# Patient Record
Sex: MALE | Race: WHITE
[De-identification: names, ages, dates, MRNs, and addresses within clinical notes are randomized per-mention and may not be internally consistent; named-entity substitution may affect disease eponyms.]

---

## 2017-12-30 ENCOUNTER — HOSPITAL ENCOUNTER (EMERGENCY)
Dept: HOSPITAL 31 - C.ER | Age: 57
Discharge: HOME | End: 2017-12-30
Payer: COMMERCIAL

## 2017-12-30 VITALS — HEART RATE: 90 BPM | DIASTOLIC BLOOD PRESSURE: 91 MMHG | SYSTOLIC BLOOD PRESSURE: 164 MMHG

## 2017-12-30 VITALS — RESPIRATION RATE: 20 BRPM | TEMPERATURE: 98.2 F

## 2017-12-30 VITALS — OXYGEN SATURATION: 100 %

## 2017-12-30 DIAGNOSIS — E11.9: ICD-10-CM

## 2017-12-30 DIAGNOSIS — H93.8X9: Primary | ICD-10-CM

## 2017-12-30 DIAGNOSIS — I10: ICD-10-CM

## 2017-12-30 NOTE — C.PDOC
History Of Present Illness


57 year old male presents to the ER stating he feels something moving in his 

ear. Denies loss of hearing or ear pain.


Chief Complaint (Nursing): ENT Problem


History Per: Patient


History/Exam Limitations: None


Onset/Duration Of Symptoms: Hrs


Current Symptoms Are (Timing): Gone


Quality (Ear): Foreign Body (Something moving)





Past Medical History


Reviewed: Historical Data, Nursing Documentation, Vital Signs


Vital Signs: 


 Last Vital Signs











Temp  98.2 F   12/30/17 04:46


 


Pulse  90   12/30/17 05:01


 


Resp  20   12/30/17 05:01


 


BP  164/91 H  12/30/17 05:01


 


Pulse Ox  99   12/30/17 05:01














- Medical History


PMH: Diabetes, HTN


Family History: States: Unknown Family Hx





- Social History


Hx Alcohol Use: No


Hx Substance Use: No





- Immunization History


Hx Tetanus Toxoid Vaccination: No


Hx Influenza Vaccination: No


Hx Pneumococcal Vaccination: No





Review Of Systems


ENT: Positive for: Other (Something moving in ear).  Negative for: Ear Pain, 

Ear Discharge





Physical Exam





- Physical Exam


Appears: Non-toxic, No Acute Distress


Skin: Normal Color, Warm, Dry


Head: Atraumatic, Normacephalic


Eye(s): bilateral: Normal Inspection


Ear(s): Bilateral: Normal (No foreign body in the canal)





ED Course And Treatment


O2 Sat by Pulse Oximetry: 100 (Room air)


Pulse Ox Interpretation: Normal


Progress Note: No foreign body found within patient's ear, patient also reports 

the sensation as resolved. Will discharge with instructions to follow up with 

PMD.





Disposition





- Disposition


Disposition: HOME/ ROUTINE


Disposition Time: 04:54


Condition: STABLE


Additional Instructions: 


Follow up with PMD as needed. Return to ED if feel worse.


Instructions:  Ear Foreign Body (ED)


Forms:  [facility] (English)





- Clinical Impression


Clinical Impression: 


 Ear discomfort








- PA / NP / Resident Statement


MD/DO has reviewed & agrees with the documentation as recorded.





- Scribe Statement


The provider has reviewed the documentation as recorded by the Scribroel Orourke





All medical record entries made by the Scribe were at my direction and 

personally dictated by me. I have reviewed the chart and agree that the record 

accurately reflects my personal performance of the history, physical exam, 

medical decision making, and the department course for this patient. I have 

also personally directed, reviewed, and agree with the discharge instructions 

and disposition.

## 2018-02-15 ENCOUNTER — HOSPITAL ENCOUNTER (EMERGENCY)
Dept: HOSPITAL 31 - C.ER | Age: 58
Discharge: HOME | End: 2018-02-15
Payer: COMMERCIAL

## 2018-02-15 VITALS — HEART RATE: 78 BPM | RESPIRATION RATE: 20 BRPM | SYSTOLIC BLOOD PRESSURE: 130 MMHG | DIASTOLIC BLOOD PRESSURE: 80 MMHG

## 2018-02-15 VITALS — TEMPERATURE: 98.5 F | OXYGEN SATURATION: 98 %

## 2018-02-15 VITALS — BODY MASS INDEX: 37.5 KG/M2

## 2018-02-15 DIAGNOSIS — E11.65: Primary | ICD-10-CM

## 2018-02-15 LAB
ALBUMIN SERPL-MCNC: 4.3 G/DL (ref 3.5–5)
ALBUMIN/GLOB SERPL: 1.2 {RATIO} (ref 1–2.1)
ALT SERPL-CCNC: 45 U/L (ref 21–72)
APTT BLD: 37 SECONDS (ref 21–34)
AST SERPL-CCNC: 35 U/L (ref 17–59)
BASOPHILS # BLD AUTO: 0 K/UL (ref 0–0.2)
BASOPHILS NFR BLD: 0.6 % (ref 0–2)
BUN SERPL-MCNC: 14 MG/DL (ref 9–20)
CALCIUM SERPL-MCNC: 9.2 MG/DL (ref 8.6–10.4)
EOSINOPHIL # BLD AUTO: 0.1 K/UL (ref 0–0.7)
EOSINOPHIL NFR BLD: 0.7 % (ref 0–4)
ERYTHROCYTE [DISTWIDTH] IN BLOOD BY AUTOMATED COUNT: 13.6 % (ref 11.5–14.5)
GFR NON-AFRICAN AMERICAN: > 60
HGB BLD-MCNC: 13.2 G/DL (ref 12–18)
INR PPP: 1.1
LYMPHOCYTES # BLD AUTO: 2.8 K/UL (ref 1–4.3)
LYMPHOCYTES NFR BLD AUTO: 36.2 % (ref 20–40)
MCH RBC QN AUTO: 26.6 PG (ref 27–31)
MCHC RBC AUTO-ENTMCNC: 33.2 G/DL (ref 33–37)
MCV RBC AUTO: 79.9 FL (ref 80–94)
MONOCYTES # BLD: 0.6 K/UL (ref 0–0.8)
MONOCYTES NFR BLD: 7.6 % (ref 0–10)
NEUTROPHILS # BLD: 4.2 K/UL (ref 1.8–7)
NEUTROPHILS NFR BLD AUTO: 54.9 % (ref 50–75)
NRBC BLD AUTO-RTO: 0.2 % (ref 0–2)
PLATELET # BLD: 324 K/UL (ref 130–400)
PMV BLD AUTO: 8.9 FL (ref 7.2–11.7)
PROTHROMBIN TIME: 12.1 SECONDS (ref 9.7–12.2)
RBC # BLD AUTO: 4.97 MIL/UL (ref 4.4–5.9)
WBC # BLD AUTO: 7.7 K/UL (ref 4.8–10.8)

## 2018-02-15 NOTE — C.PDOC
History Of Present Illness


58 y/o male presents to the ER upon referral of cardiologist  for abnormal lab 

results. Patient reports that he had routine bloodwork 2 days ago and his 

potassium level was 6.2 and his glucose level was high. Patient reports he has 

a high glucose levels for the past 3 months and he checks his blood sugar 

everyday. He reports that his blood glucose levels are in the 200- 300 range 

even though he is taking his medications. He has an appointment with his 

endocrinologist next week. Patient reports that he feels weak "from time to time

" and does not have other complaints.


Time Seen by Provider: 02/15/18 11:36


Chief Complaint (Nursing): Abnormal Labs


History Per: Patient


History/Exam Limitations: no limitations





Past Medical History


Reviewed: Historical Data, Nursing Documentation, Vital Signs


Vital Signs: 


 Last Vital Signs











Temp  98.5 F   02/15/18 11:15


 


Pulse  78   02/15/18 14:15


 


Resp  20   02/15/18 14:15


 


BP  130/80   02/15/18 14:15


 


Pulse Ox  98   02/15/18 15:27














- Medical History


PMH: Diabetes, HTN


Other Surgeries: Hx of surgeries


Family History: States: No Known Family Hx





- Social History


Hx Alcohol Use: No


Hx Substance Use: No





- Immunization History


Hx Tetanus Toxoid Vaccination: No


Hx Influenza Vaccination: No


Hx Pneumococcal Vaccination: No





Review Of Systems


Except As Marked, All Systems Reviewed And Found Negative.


Constitutional: Positive for: Weakness





Physical Exam





- Physical Exam


Appears: Non-toxic, No Acute Distress


Skin: Normal Color, Warm


Head: Atraumatic, Normacephalic


Eye(s): bilateral: Normal Inspection


Nose: Normal


Oral Mucosa: Moist


Neck: Supple


Chest: Symmetrical


Cardiovascular: Rhythm Regular


Respiratory: Normal Breath Sounds, No Accessory Muscle Use, No Rales, No Rhonchi

, No Wheezing


Extremity: Normal ROM


Neurological/Psych: Oriented x3, Normal Speech, Normal Motor, Normal Sensation





ED Course And Treatment





- Laboratory Results


Result Diagrams: 


 02/15/18 12:55





 02/15/18 12:59


O2 Sat by Pulse Oximetry: 98 (RA)


Pulse Ox Interpretation: Normal


Progress Note: Labs ordered. Glucose levels found to be 300. Potassium is 3.7.  

Patient has been given a copy of his bloodwork to show to his PMD. Patient 

reports that has an appointment with his doctor in the evening.





Disposition





- Disposition


Disposition: HOME/ ROUTINE


Disposition Time: 13:42


Condition: STABLE


Additional Instructions: 


Follow up with PMD within 1-2 days. Return to Ed if feel worse.


Instructions:  Hyperglycemia, Adult


Forms:  [facility] (English)





- Clinical Impression


Clinical Impression: 


 Hyperglycemia








- PA / NP / Resident Statement


MD/DO has reviewed & agrees with the documentation as recorded.





- Scribe Statement


The provider has reviewed the documentation as recorded by the Gerry Liu





Provider Attestation 





All medical record entries made by the Raviibroel were at my direction and 

personally dictated by me. I have reviewed the chart and agree that the record 

accurately reflects my personal performance of the history, physical exam, 

medical decision making, and the department course for this patient. I have 

also personally directed, reviewed, and agree with the discharge instructions 

and disposition.

## 2018-08-27 ENCOUNTER — HOSPITAL ENCOUNTER (INPATIENT)
Dept: HOSPITAL 31 - C.ER | Age: 58
LOS: 3 days | Discharge: HOME | DRG: 66 | End: 2018-08-30
Attending: INTERNAL MEDICINE | Admitting: INTERNAL MEDICINE
Payer: COMMERCIAL

## 2018-08-27 VITALS — BODY MASS INDEX: 37.5 KG/M2

## 2018-08-27 DIAGNOSIS — Z79.82: ICD-10-CM

## 2018-08-27 DIAGNOSIS — Z87.891: ICD-10-CM

## 2018-08-27 DIAGNOSIS — I63.9: Primary | ICD-10-CM

## 2018-08-27 DIAGNOSIS — E66.9: ICD-10-CM

## 2018-08-27 DIAGNOSIS — R47.81: ICD-10-CM

## 2018-08-27 DIAGNOSIS — E78.5: ICD-10-CM

## 2018-08-27 DIAGNOSIS — E11.9: ICD-10-CM

## 2018-08-27 DIAGNOSIS — I10: ICD-10-CM

## 2018-08-27 LAB
ALBUMIN SERPL-MCNC: 4.9 [, G/DL] (ref 3.5–5)
ALBUMIN/GLOB SERPL: 1.4 [,] (ref 1–2.1)
ALT SERPL-CCNC: 53 [, U/L] (ref 21–72)
APTT BLD: 34 [, SECONDS] (ref 21–34)
AST SERPL-CCNC: 49 [, U/L] (ref 17–59)
BASOPHILS # BLD AUTO: 0 [, K/UL] (ref 0–0.2)
BASOPHILS NFR BLD: 0.4 [, %] (ref 0–2)
BUN SERPL-MCNC: 30 [, MG/DL] (ref 9–20)
CALCIUM SERPL-MCNC: 9.7 [, MG/DL] (ref 8.6–10.4)
EOSINOPHIL # BLD AUTO: 0.1 [, K/UL] (ref 0–0.7)
EOSINOPHIL NFR BLD: 0.7 [, %] (ref 0–4)
ERYTHROCYTE [DISTWIDTH] IN BLOOD BY AUTOMATED COUNT: 13.9 [, %] (ref 11.5–14.5)
GFR NON-AFRICAN AMERICAN: 52 [,]
HDLC SERPL-MCNC: 24 [, MG/DL] (ref 30–70)
HGB BLD-MCNC: 13.9 [, G/DL] (ref 12–18)
INR PPP: 1.2 [,]
LDLC SERPL-MCNC: 71 [, MG/DL] (ref 0–129)
LYMPHOCYTES # BLD AUTO: 3.7 [, K/UL] (ref 1–4.3)
LYMPHOCYTES NFR BLD AUTO: 32.8 [, %] (ref 20–40)
MCH RBC QN AUTO: 25 [, PG] (ref 27–31)
MCHC RBC AUTO-ENTMCNC: 32.4 [, G/DL] (ref 33–37)
MCV RBC AUTO: 77.1 [, FL] (ref 80–94)
MONOCYTES # BLD: 1.2 [, K/UL] (ref 0–0.8)
MONOCYTES NFR BLD: 10.6 [, %] (ref 0–10)
NEUTROPHILS # BLD: 6.2 [, K/UL] (ref 1.8–7)
NEUTROPHILS NFR BLD AUTO: 55.5 [, %] (ref 50–75)
NRBC BLD AUTO-RTO: 0 [, %] (ref 0–2)
PLATELET # BLD: 347 [, K/UL] (ref 130–400)
PMV BLD AUTO: 8.4 [, FL] (ref 7.2–11.7)
PROTHROMBIN TIME: 12.6 [, SECONDS] (ref 9.7–12.2)
RBC # BLD AUTO: 5.56 [, MIL/UL] (ref 4.4–5.9)
WBC # BLD AUTO: 11.2 [, K/UL] (ref 4.8–10.8)

## 2018-08-27 NOTE — C.PDOC
History Of Present Illness


59 y/o M c PMHx HTN, DM p/w slurred speech x 30 hours. Patient states that 

around 1400 yesterday (this is the last time well), he had a sudden feeling of 

chest "pulling" with camera flash like sensation. Chest pulling resolved after 

about 1 hour but then patient had slurred speech since that time. The pulling 

feeling and flash sensation occurred again around 1400 today and the slurred 

speech, which seemed to be improving, became worse again. He also reports a 

slight feeling of unsteady gait. He denies fever, chills, numbness, motor 

weakness, vision change, taste change, facial droop. He notes he was started on 

a new blood pressure medication 6 days ago.


Time Seen by Provider: 18 19:37


Chief Complaint (Nursing): Altered Mental Status


History Per: Patient, Family


History/Exam Limitations: no limitations


Onset/Duration Of Symptoms: Days


Current Symptoms Are (Timing): Still Present


Recent travel outside of the [place]: No


Additional History Per: Patient, Family





Past Medical History


Reviewed: Historical Data, Nursing Documentation, Vital Signs


Vital Signs: 


 Last Vital Signs











Temp  99.5 F   18 19:01


 


Pulse  93 H  18 21:46


 


Resp  17   18 21:46


 


BP  128/76   18 21:46


 


Pulse Ox  98   18 21:46














- Medical History


PMH: Diabetes, HTN


Surgical History: No Surg Hx


Family History: States: Unknown Family Hx





- Social History


Hx Alcohol Use: No


Hx Substance Use: No





- Immunization History


Hx Tetanus Toxoid Vaccination: No


Hx Influenza Vaccination: No


Hx Pneumococcal Vaccination: No





Review Of Systems


Except As Marked, All Systems Reviewed And Found Negative.


Constitutional: Negative for: Fever


Respiratory: Negative for: Shortness of Breath





Physical Exam





- Physical Exam


Additional Physical Exam Comments: 


Constitutional: No acute distress.


Head: Normocephalic. Atraumatic.


Eyes: PERRL.


ENT: Moist mucous membranes.


Neck: Supple.


Cardiovascular: Regular rate. Radial pulse 2+ bilaterally.


Chest: No tenderness.


Respiratory: Clear to auscultation bilaterally.


GI: Soft. Nontender. Nondistended.


Back: No CVA tenderness.


Musculoskeletal: No tenderness or swelling of extremities.


Skin: No rash.


Neurologic: Alert, no focal deficit. Speech sounds mildly slurred but fully 

understandable.





ED Course And Treatment





- Laboratory Results


Result Diagrams: 


 18 20:22





 18 20:22


O2 Sat by Pulse Oximetry: 99 (ON RA)


Pulse Ox Interpretation: Normal





NIHSS Stroke Scale





- Date/Time Evaluation Performed


Date Performed: 18


Time Performed: 20:06


When Was NIHSS Performed: Baseline





- How Severe is the Stroke


Level of Consciousness: 0=Alert


LOC to Questions: 0=Both comments correct


LOC to commands: 0=Obeys both correctly


Best Gaze: 0=Normal


Visual: 0=No visual loss


Facial: 0=Normal


Motor Arm - Left: 0=No drift


Motor Arm - Right: 0=No drift


Motor Leg - Left: 0=No drift


Motor Leg - Right: 0=No drift


Limb Ataxia: 0=Absent


Sensory: 0=Normal


Best Language: 0=No aphasia


Dysarthia: 1=Mild to moderate slurring


Extinction & Inattention (Neglect): 0=Normal, no object


Score: 1





rTPA Inclusion/Exclusion





- Refusal of Treatment


Patient Refused Treatment: No





- Inclusion Criteria for Altepase


Patient is 18 years or Older: Yes


The Clinical Diagnosis of Ischemic Stroke That is Causing a Potentially 

Disabling Neurological Deficit: Yes


Time of Onset is Well Established to be Less Than 270 Minute Before Treatment 

Would Begin: No


Risk/Benefit Discussed With Patient/Family Member Present: Yes





Medical Decision Making


Medical Decision Making: 


Plan:


* CT head


* EKG


* Labs


* CXR


* IV fluids








Name: FADI YUSUF Age: 58Years M Date: 2018


Requesting Physician: Tyrel Rodríguez MRN: [id number] : 1960


[facility] Procedure Ordered As Accession Number of Images


CT HEAD WO CT HEAD W O CONTRAST [id number]


Provided Clinical History: slurred speech


EXAM:


CT Head Without Intravenous Contrast


EXAM DATE/TIME:


2018 8:01 PM


CLINICAL HISTORY:


58 years old, male; Signs and symptoms; Visual disturbance; Additional info: 

Slurred speech


TECHNIQUE:


Axial computed tomography images of the head/brain without intravenous 

contrast. All CT scans at


this facility use at least one of these dose optimization techniques: automated 

exposure control; mA


and/or kV adjustment per patient size (includes targeted exams where dose is 

matched to clinical


indication); or iterative reconstruction.


Coronal and sagittal reformatted images were created and reviewed.


COMPARISON:


No relevant prior studies available.


FINDINGS:


Brain: Relatively focal hypoattenuation in the region of the left external 

capsule. No hemorrhage.


No significant white matter disease.


Ventricles: No ventriculomegaly.


Bones/joints: No acute fracture.


Soft tissues: Unremarkable.


Sinuses: Unremarkable as visualized. No acute sinusitis.


Mastoid air cells: Unremarkable as visualized. No mastoid effusion.


IMPRESSION:


Relatively focal left external capsule chronic changes versus an age-

indeterminate stroke.


Thank you for allowing us to participate in the care of your patient.


Dictated and Authenticated by: Nicolás Jimenez MD


2018 8:59 PM Eastern Time ([place] & [place])





Dr. Rudolph recommends Plavix 300 and ASA 81, will order echo/MRI/MRA in AM. Dr. Thomason agrees with consult, Dr. Parnell accepts to his service.





Disposition


Discussed With : Guy Rudolph


Doctor Will See Patient In The: Hospital





- Disposition


Disposition: HOSPITALIZED


Disposition Time: 21:00


Condition: GUARDED


Forms:  [facility] (English)





- Clinical Impression


Clinical Impression: 


 CVA (cerebral vascular accident), Slurred speech








- Scribe Statement


The provider has reviewed the documentation as recorded by the Scribe


Dez Sheridan





All medical record entries made by the Scribe were at my direction and 

personally dictated by me. I have reviewed the chart and agree that the record 

accurately reflects my personal performance of the history, physical exam, 

medical decision making, and the department course for this patient. I have 

also personally directed, reviewed, and agree with the discharge instructions 

and disposition.

## 2018-08-28 LAB
FOLATE SERPL-MCNC: 16.8 [, NG/ML]
VIT B12 SERPL-MCNC: 514 [, PG/ML] (ref 239–931)

## 2018-08-28 RX ADMIN — HUMAN INSULIN SCH: 100 INJECTION, SOLUTION SUBCUTANEOUS at 23:00

## 2018-08-28 RX ADMIN — PANTOPRAZOLE SODIUM SCH MG: 20 TABLET, DELAYED RELEASE ORAL at 10:50

## 2018-08-28 RX ADMIN — DEXTROSE AND SODIUM CHLORIDE SCH: 5; 900 INJECTION, SOLUTION INTRAVENOUS at 21:45

## 2018-08-28 RX ADMIN — HUMAN INSULIN SCH UNIT: 100 INJECTION, SOLUTION SUBCUTANEOUS at 17:30

## 2018-08-28 NOTE — CP.PCM.CON
<Karis Romero - Last Filed: 18 17:39>





History of Present Illness





- History of Present Illness


History of Present Illness: 





Neurology Consult Note - Dr Rudolph





Patient is a 58 year old male with past medical history of Diabetes Mellitus, 

Hyperlipidemia and Hypertension who presented to the hospital for slurred 

speech and vision changes. Patient states that  afternoon he started 

experiencing visual changes. He states that he was seeing halos, started 

feeling generalized weakness and disoriented. When he attempted to call his 

wife on the phone he noticed he was slurring his speech. He states that the 

visual changes resolved after 5 minutes and the slurring of the speech resolved 

after a few hours. On Monday, he started seeing sparkles and crystals. This was 

also accompanied by generalized weakness, disorientation and slurring of 

speech. Patient also reports having a chest pulling sensation that he 

attributes to feeling anxious. By the time he arrived to the emergency 

department, only the slurred speech persisted. Patient states that this has 

never happened to him before. He recently had a change in his blood pressure 

medication a week ago by his cardiologist Dr Thomason. Prior to change in 

medication, patient states that his blood pressures normally were 150-160s 

systolic. Afterwards he noticed his BPs were in the 120s. He states that even 

with SBP readings less than 120 he was taking his BP medications. Approximately 

2 hours after taking the medication, he began to feel symptoms. Today patient 

states that his speech is 90% back to baseline. Denies fevers, chills, headaches

, dizziness, visual/hearing changes, cp, palpitations, sob, abdominal pain, 

urinary symptoms, changes in bowel habits. 





Allergies: NKDA


Medical History: Hypertension, Diabetes Mellitus, Hyperlipidemia


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


Social History: Former smoker, quit 27 years ago, was smoking 1/2 ppd, drinks 

beer occasionally, denies drug use


Family History: Grandmother - CVA in 70s, Grandfather - Prostate CA, Mother - DM

, Father - , liver cirrhosis 








Past Patient History





- Infectious Disease


Hx of Infectious Diseases: None





- Past Medical History & Family History


Past Medical History?: Yes





- Past Social History


Smoking Status: Never Smoked





- CARDIAC


Hx Hypertension: Yes





- ENDOCRINE/METABOLIC


Hx Diabetes Mellitus Type 2: Yes





- MUSCULOSKELETAL/RHEUMATOLOGICAL


Hx Falls: No





- PSYCHIATRIC


Hx Substance Use: No





- SURGICAL HISTORY


Hx Surgeries: Yes


Other/Comment: Scrotal tumor removal .





- ANESTHESIA


Hx Anesthesia: Yes


Hx Anesthesia Reactions: No


Hx Malignant Hyperthermia: No





Meds


Allergies/Adverse Reactions: 


 Allergies











Allergy/AdvReac Type Severity Reaction Status Date / Time


 


No Known Allergies Allergy   Verified 18 19:03














- Medications


Medications: 


 Current Medications





Aspirin (Ecotrin)  325 mg PO DAILY [facility]


Dextrose/Sodium Chloride (Dextrose 5%/0.45% Ns 1000 Ml)  1,000 mls @ 80 mls/hr 

IV .[id number] [facility]


   Last Admin: 18 01:50 Dose:  80 mls/hr


Pneumococcal Polyvalent Vaccine (Pneumovax 23 Vaccine)  0.5 ml IM .ONCE ONE


   Stop: 18 10:01


Rosuvastatin Calcium (Crestor)  10 mg PO [facility]











Physical Exam





- Constitutional


Appears: Well, No Acute Distress





- Head Exam


Head Exam: ATRAUMATIC, NORMAL INSPECTION, NORMOCEPHALIC





- Eye Exam


Eye Exam: EOMI, Normal appearance, PERRL


Pupil Exam: NORMAL ACCOMODATION





- ENT Exam


ENT Exam: Mucous Membranes Moist





- Neck Exam


Neck exam: Positive for: Full Rom





- Respiratory Exam


Respiratory Exam: Clear to Auscultation Bilateral, NORMAL BREATHING PATTERN.  

absent: Rales, Rhonchi, Wheezes





- Cardiovascular Exam


Cardiovascular Exam: REGULAR RHYTHM, +S1, +S2, Systolic Murmur





- GI/Abdominal Exam


GI & Abdominal Exam: Normal Bowel Sounds, Soft.  absent: Tenderness





- Extremities Exam


Extremities exam: Positive for: normal inspection, pedal pulses present.  

Negative for: calf tenderness





- Back Exam


Back exam: NORMAL INSPECTION





- Neurological Exam


Neurological exam: Alert, CN II-XII Intact, Normal Gait, Oriented x3





- Expanded Neurological Exam


  ** Expanded


Patient oriented to: person, place, time


Speech: Slurred Speech (mild)


Cranial nerves: EOM's Intact: Normal, Facial Sensation: Normal, Nystagmus: 

Normal, Tongue Deviation: Normal


Ataxia: No


Cerebellar Function: Finger to Nose: Normal


Neuro motor strength exam: Left Upper Extremity: 5, Right Upper Extremity: 5, 

Left Lower Extremity: 5, Right Lower Extremity: 5





- Psychiatric Exam


Psychiatric exam: Normal Affect, Normal Mood





- Skin


Skin Exam: Dry, Normal Color, Warm





Results





- Vital Signs


Recent Vital Signs: 


 Last Vital Signs











Temp  98.2 F   18 07:35


 


Pulse  80   18 07:35


 


Resp  18   18 07:35


 


BP  144/83   18 07:35


 


Pulse Ox  98   18 07:35














- Labs


Result Diagrams: 


 18 20:22





 18 20:22


Labs: 


 Laboratory Results - last 24 hr











  18





  20:22 20:22 20:22


 


WBC  11.2 H  


 


RBC  5.56  


 


Hgb  13.9  


 


Hct  42.8  


 


MCV  77.1 L  


 


MCH  25.0 L  


 


MCHC  32.4 L  


 


RDW  13.9  


 


Plt Count  347  


 


MPV  8.4  


 


Neut % (Auto)  55.5  


 


Lymph % (Auto)  32.8  


 


Mono % (Auto)  10.6 H  


 


Eos % (Auto)  0.7  


 


Baso % (Auto)  0.4  


 


Neut # (Auto)  6.2  


 


Lymph # (Auto)  3.7  


 


Mono # (Auto)  1.2 H  


 


Eos # (Auto)  0.1  


 


Baso # (Auto)  0.0  


 


PT   12.6 H 


 


INR   1.2 


 


APTT   34 


 


Sodium    143


 


Potassium    3.9


 


Chloride    102


 


Carbon Dioxide    27


 


Anion Gap    19


 


BUN    30 H


 


Creatinine    1.4


 


Est GFR ( Amer)    > 60


 


Est GFR (Non-Af Amer)    52


 


Random Glucose    213 H


 


Hemoglobin A1c   


 


Calcium    9.7


 


Total Bilirubin    0.4


 


AST    49


 


ALT    53


 


Alkaline Phosphatase    178 H


 


Troponin I    < 0.0120


 


Total Protein    8.2


 


Albumin    4.9


 


Globulin    3.4


 


Albumin/Globulin Ratio    1.4


 


Triglycerides    164 H D


 


Cholesterol    134


 


LDL Cholesterol Direct    71


 


HDL Cholesterol    24 L


 


Blood Type   


 


Antibody Screen   














  18





  20:22 21:01


 


WBC  


 


RBC  


 


Hgb  


 


Hct  


 


MCV  


 


MCH  


 


MCHC  


 


RDW  


 


Plt Count  


 


MPV  


 


Neut % (Auto)  


 


Lymph % (Auto)  


 


Mono % (Auto)  


 


Eos % (Auto)  


 


Baso % (Auto)  


 


Neut # (Auto)  


 


Lymph # (Auto)  


 


Mono # (Auto)  


 


Eos # (Auto)  


 


Baso # (Auto)  


 


PT  


 


INR  


 


APTT  


 


Sodium  


 


Potassium  


 


Chloride  


 


Carbon Dioxide  


 


Anion Gap  


 


BUN  


 


Creatinine  


 


Est GFR ( Amer)  


 


Est GFR (Non-Af Amer)  


 


Random Glucose  


 


Hemoglobin A1c  8.2 H 


 


Calcium  


 


Total Bilirubin  


 


AST  


 


ALT  


 


Alkaline Phosphatase  


 


Troponin I  


 


Total Protein  


 


Albumin  


 


Globulin  


 


Albumin/Globulin Ratio  


 


Triglycerides  


 


Cholesterol  


 


LDL Cholesterol Direct  


 


HDL Cholesterol  


 


Blood Type   A POSITIVE


 


Antibody Screen   Negative














Assessment & Plan





- Assessment and Plan (Free Text)


Assessment: 





A/P: Patient is a 58 year old male with past medical history of Hypertension, 

HLD, Diabetes Mellitus who presented with slurred speech and vision changes. 

Patient was outside of the window for tpa due to onset of symptoms.





Acute CVA


-Stable, afebrile


-Monitor on telemetry


-CT head showed 1.1cm focal area of hypoattenuation seen in the left external 

capsule/basal ganglia region suggestive for age indeterminant ischemic changes


-MRI of brain showed late acute/subacute infarction in the left posterior 

putamen. Mild age-related global parenchymal volume loss


-Patient received ASA 81mg and Loading dose of Plavix in the Emergency dept


-Continue ASA 81mg PO daily, Plavix 75mg PO daily


-CTA head and neck ordered


-Recommend repeating echo with bubble study to rule out PFO 


-Will also recommend holter monitor 


-Physical Therapy/Speech therapy evaluation


-Patient to continue dual anti-platelet therapy for 3 weeks, he will follow up 

with neurology outpatient for follow up and platelet function testing





Hyperlipidemia


-Continue high dose statin therapy





Diabetes Mellitus


-HgA1c 8.2 


-Insulin sliding scale and accuchecks ACHS


-Moderate carbohydrate and low sodium diet 





Hypertension


-Maintain SBP 140s


-EKG showed NSR with possible left atrial enlargement





Plan discussed with Dr Ronni Romero DO PGY-2 





<Guy Rudolph - Last Filed: 18 19:36>





Meds





- Medications


Medications: 


 Current Medications





Aspirin (Aspirin Chewable)  81 mg PO DAILY [facility]


   Last Admin: 18 09:30 Dose:  81 mg


Carvedilol (Coreg)  12.5 mg PO BID [facility]


   Last Admin: 18 18:30 Dose:  12.5 mg


Clopidogrel Bisulfate (Plavix)  75 mg PO DAILY [facility]


   Last Admin: 18 09:29 Dose:  75 mg


Dextrose (Glutose 15)  0 gm PO ONCE PRN; Protocol


   PRN Reason: Hypoglycemia Protocol


Dextrose (Dextrose 50% Inj)  0 ml IV STAT PRN; Protocol


   PRN Reason: Hypoglycemia Protocol


Ergocalciferol (Drisdol 50,000 Intl Units Cap)  1 cap PO QWK [facility]


   Last Admin: 18 17:06 Dose:  1 cap


Glucagon (Glucagen Diagnostic Kit)  0 mg IM STAT PRN; Protocol


   PRN Reason: Hypoglycemia Protocol


Home Med (Patient's Own Medication)  10 tab PO DAILY [facility]


   Last Admin: 18 09:29 Dose:  10 tab


Dextrose (Dextrose 5% In Water 1000 Ml)  1,000 mls @ 0 mls/hr IV .Q0M PRN; 

Protocol; Per Protocol


   PRN Reason: Hypoglycemia Protocol


Insulin Human Regular (Novolin R)  0 unit SC ACHS [facility]


   PRN Reason: Protocol


   Last Admin: 18 18:33 Dose:  2 units


Pantoprazole Sodium (Protonix Ec Tab)  20 mg PO DAILY [facility]


   Last Admin: 18 09:27 Dose:  20 mg


Pneumococcal Polyvalent Vaccine (Pneumovax 23 Vaccine)  0.5 ml IM .ONCE ONE


   Stop: 18 10:01


Rosuvastatin Calcium (Crestor)  10 mg PO [facility]


   Last Admin: 18 21:17 Dose:  10 mg











Results





- Vital Signs


Recent Vital Signs: 


 Last Vital Signs











Temp  98.7 F   18 15:00


 


Pulse  79   18 16:39


 


Resp  20   18 15:00


 


BP  128/66   18 18:30


 


Pulse Ox  95   18 15:00














- Labs


Result Diagrams: 


 18 08:36





 18 08:36


Labs: 


 Laboratory Results - last 24 hr











  18





  08:36 08:36 11:06


 


WBC  7.3  


 


RBC  5.38  


 


Hgb  14.0  


 


Hct  41.1  


 


MCV  76.5 L  


 


MCH  26.0 L  


 


MCHC  33.9  


 


RDW  14.0  


 


Plt Count  303  


 


MPV  8.5  


 


Neut % (Auto)  54.5  


 


Lymph % (Auto)  32.9  


 


Mono % (Auto)  10.2 H  


 


Eos % (Auto)  1.6  


 


Baso % (Auto)  0.8  


 


Neut # (Auto)  4.0  


 


Lymph # (Auto)  2.4  


 


Mono # (Auto)  0.7  


 


Eos # (Auto)  0.1  


 


Baso # (Auto)  0.1  


 


Sodium   140 


 


Potassium   3.7 


 


Chloride   101 


 


Carbon Dioxide   24 


 


Anion Gap   18 


 


BUN   17 


 


Creatinine   0.8 


 


Est GFR ( Amer)   > 60 


 


Est GFR (Non-Af Amer)   > 60 


 


POC Glucose (mg/dL)    230 H


 


Random Glucose   191 H 


 


Calcium   9.4 


 


Phosphorus   3.7 


 


Magnesium   2.0 


 


Total Bilirubin   0.6 


 


AST   50 


 


ALT   56 


 


Alkaline Phosphatase   178 H 


 


Total Protein   7.8 


 


Albumin   4.5 


 


Globulin   3.4 


 


Albumin/Globulin Ratio   1.3 














  18





  16:58


 


WBC 


 


RBC 


 


Hgb 


 


Hct 


 


MCV 


 


MCH 


 


MCHC 


 


RDW 


 


Plt Count 


 


MPV 


 


Neut % (Auto) 


 


Lymph % (Auto) 


 


Mono % (Auto) 


 


Eos % (Auto) 


 


Baso % (Auto) 


 


Neut # (Auto) 


 


Lymph # (Auto) 


 


Mono # (Auto) 


 


Eos # (Auto) 


 


Baso # (Auto) 


 


Sodium 


 


Potassium 


 


Chloride 


 


Carbon Dioxide 


 


Anion Gap 


 


BUN 


 


Creatinine 


 


Est GFR ( Amer) 


 


Est GFR (Non-Af Amer) 


 


POC Glucose (mg/dL)  184 H


 


Random Glucose 


 


Calcium 


 


Phosphorus 


 


Magnesium 


 


Total Bilirubin 


 


AST 


 


ALT 


 


Alkaline Phosphatase 


 


Total Protein 


 


Albumin 


 


Globulin 


 


Albumin/Globulin Ratio 














Attending/Attestation





- Attestation


I have personally seen and examined this patient.: Yes


I have fully participated in the care of the patient.: Yes


I have reviewed all pertinent clinical information: Yes


Notes (Text): 





18 19:34


On my physical exam, the patient had slight dysarthria with no aphasia.  CN 2-

12 were intact, strength was full and symmetrical, sensation was intact, 

coordination was normal, no ataxia, reflexes were normal, gait was normal.


18 19:35

## 2018-08-28 NOTE — CT
Date of service: 



08/27/2018



PROCEDURE:  CT HEAD WITHOUT CONTRAST.



HISTORY:

slurred speech



COMPARISON:

None available.



TECHNIQUE:

Axial computed tomography images were obtained through the head/brain 

without intravenous contrast.  



Radiation dose:



Total exam DLP = 992 mGy-cm.



This CT exam was performed using one or more of the following dose 

reduction techniques: Automated exposure control, adjustment of the 

mA and/or kV according to patient size, and/or use of iterative 

reconstruction technique.



FINDINGS:



HEMORRHAGE:

No intracranial hemorrhage. 



BRAIN:

1.1 centimeter focal area of hypoattenuation seen in the left 

external capsule/ basal ganglia region suggestive for 

age-indeterminate ischemic change. Clinical correlation.  Correlation 

with MRI may be helpful for further evaluation if clinically 

indicated to better determine chronicity of possible ischemic change. 



VENTRICLES:

Unremarkable. No hydrocephalus. 



CALVARIUM:

Small bony protuberance emanating from the posterior parietal cranium.



PARANASAL SINUSES:

Unremarkable as visualized. No significant inflammatory changes.



MASTOID AIR CELLS:

Unremarkable as visualized. No inflammatory changes.



OTHER FINDINGS:

None.



IMPRESSION:

1.1 centimeter focal area of hypoattenuation seen in the left 

external capsule/ basal ganglia region suggestive for 

age-indeterminate ischemic change. Clinical correlation. Correlation 

with MRI may be helpful for further evaluation if clinically 

indicated to better determine chronicity of possible ischemic change. 



These findings were preliminarily reported at 8:59 p.m. on 8/27/2018 

by Dr. Nicolás Jimenez from [facility].

## 2018-08-28 NOTE — CP.PCM.PN
Subjective





- Date & Time of Evaluation


Date of Evaluation: 18


Time of Evaluation: 09:00





- Subjective


Subjective: 





Progress Note for Dr. Parnell's Service





Patient was seen and examined at bedside. Patient reports he feels closely back 

to baseline. Denied any weakness, decrease in strength or sensation in any of 

his 4 extremities. He admits his speech has improved since . Denied fever

, chills, chest pain, SOB, abdominal pain, weakness or confusion. 





--





This is a 58 year old  male with PMHx of HTN, HLD, and DM who 

presented to the ED with changes in vision, slurred speech and bilateral upper 

extremity weakness x 2 days. Patient reports  afternoon, shortly after 

lunch he felt a weird sensation in his chest, noted his vision became blurry 

bilaterally, and had profound weakness in both his upper extremities. He walked 

himself to the kitchen and he called out for his wife and noted his speech was 

mumbled. He started within an hour of onset, the weakness started to resolve, 

vision back to baseline and his speech started to improve. The patient reported 

Monday after he had the same exact symptoms happen after lunch. His speech 

became even more slurred after this episode which prompted him to come to the 

ED. Patient reports his speech is close to baseline. Denied any weakness or 

decrease in sensation to any extremity. ROS unremarkable unless noted above. 





Medical History: Hypertension, Diabetes Mellitus, Hyperlipidemia


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


Meds: As per MAR; meds confirmed 


Allergies: NKDA


Social History: Former smoker, quit 27 years ago, was smoking 1/2 ppd, drinks 

beer occasionally, denies drug use


Family History: Grandmother - CVA in 70s, Grandfather - Prostate CA, Mother - DM

, Father - , liver cirrhosis 








Objective





- Vital Signs/Intake and Output


Vital Signs (last 24 hours): 


 











Temp Pulse Resp BP Pulse Ox


 


 98.2 F   80   18   144/83   98 


 


 18 07:35  18 07:35  18 07:35  18 07:35  18 07:35








Intake and Output: 


 











 18





 06:59 18:59


 


Intake Total  900


 


Output Total 500 


 


Balance -500 900














- Medications


Medications: 


 Current Medications





Aspirin (Aspirin)  325 mg PO DAILY [facility]


   Last Admin: 18 10:50 Dose:  325 mg


Carvedilol (Coreg)  12.5 mg PO BID [facility]


Chlorthalidone (Hygroton)  25 mg PO DAILY [facility]


Dextrose (Glutose 15)  0 gm PO ONCE PRN; Protocol


   PRN Reason: Hypoglycemia Protocol


Dextrose (Dextrose 50% Inj)  0 ml IV STAT PRN; Protocol


   PRN Reason: Hypoglycemia Protocol


Ergocalciferol (Drisdol 50,000 Intl Units Cap)  1 cap PO QWK [facility]


Glucagon (Glucagen Diagnostic Kit)  0 mg IM STAT PRN; Protocol


   PRN Reason: Hypoglycemia Protocol


Home Med (Patient's Own Medication)  10 tab PO DAILY [facility]


   Last Admin: 18 14:08 Dose:  10 tab


Dextrose/Sodium Chloride (Dextrose 5%/0.9% Ns 1000 Ml)  1,000 mls @ 80 mls/hr 

IV .[id number] [facility]


Dextrose (Dextrose 5% In Water 1000 Ml)  1,000 mls @ 0 mls/hr IV .Q0M PRN; 

Protocol; Per Protocol


   PRN Reason: Hypoglycemia Protocol


Insulin Human Regular (Novolin R)  0 unit SC ACHS [facility]


   PRN Reason: Protocol


Losartan Potassium (Cozaar)  100 mg PO DAILY [facility]


Pantoprazole Sodium (Protonix Ec Tab)  20 mg PO DAILY [facility]


   Last Admin: 18 10:50 Dose:  20 mg


Pneumococcal Polyvalent Vaccine (Pneumovax 23 Vaccine)  0.5 ml IM .ONCE ONE


   Stop: 18 10:01


Rosuvastatin Calcium (Crestor)  10 mg PO HS [facility]











- Labs


Labs: 


 





 18 20:22 





 18 20:22 





 











PT  12.6 SECONDS (9.7-12.2)  H  18  20:22    


 


INR  1.2   18  20:22    


 


APTT  34 SECONDS (21-34)   18  20:22    














- Constitutional


Appears: No Acute Distress





- Head Exam


Head Exam: NORMAL INSPECTION, NORMOCEPHALIC





- Eye Exam


Eye Exam: EOMI, Normal appearance, PERRL


Pupil Exam: NORMAL ACCOMODATION





- ENT Exam


ENT Exam: Mucous Membranes Moist, Normal Exam





- Neck Exam


Neck Exam: Normal Inspection





- Respiratory Exam


Respiratory Exam: Clear to Ausculation Bilateral, NORMAL BREATHING PATTERN.  

absent: Decreased Breath Sounds





- Cardiovascular Exam


Cardiovascular Exam: REGULAR RHYTHM





- GI/Abdominal Exam


GI & Abdominal Exam: Soft, Normal Bowel Sounds.  absent: Distended, Tenderness





- Rectal Exam


Rectal Exam: Deferred





- Extremities Exam


Extremities Exam: Normal Inspection.  absent: Pedal Edema, Tenderness





- Neurological Exam


Neurological Exam: Alert, Awake, CN II-XII Intact, Normal Gait, Oriented x3, 

Reflexes Normal


Neuro motor strength exam: Left Upper Extremity: 5, Right Upper Extremity: 5, 

Left Lower Extremity: 5, Right Lower Extremity: 5





- Psychiatric Exam


Psychiatric exam: Normal Affect, Normal Mood





- Skin


Skin Exam: Dry, Intact, Normal Color, Warm





Assessment and Plan





- Assessment and Plan (Free Text)


Plan: 





Subacute Infarct 


Neurology consulted - Dr. Rudolph 





Imaging:


- Head CT (): 1.1 centimeter focal area of hypoattenuation seen in the left 

external capsule/ basal ganglia region suggestive for age-indeterminate 

ischemic change. Clinical correlation. Correlation with MRI may be helpful for 

further evaluation if clinically indicated to better determine chronicity of 

possible ischemic change. 


- Brain MRI (): 1. Late acute/ subacute infarction in the left posterior 

putamen. 2. Mild age-related global parenchymal volume loss. 


- ECHO: pending official read; read by Dr. Thomason as normal. 


- Head / Neck CTA: pending 


- ECHO with bubble study rule out shunt/ PFO


- Reccs for outpatient holter monitor to access any paroxysmal atrial 

fibrillation 





Meds:


- Aspirin daily, Plavix daily, and Crestor daily 





Hypertension


HLD


Cardiology consulted: Dr. Thomason





- Coreg 12.5mg PO BID 


- Will hold off on other blood pressure medications, allowing for permissive 

hypertension





Type 2 Diabetes Mellitus


- Accuchecks 


- Hemoglobin A1C 8.2 


- ISS - low 


- Home medications to be resumed on discharge: Metformin, Glipizide 





Vitamin D Deficiency


- Vitamin D 19.6


- Ergocalciferol 50,000 K 1 tablet weekly for 6 months  





Prophylactic Measures 


- GI PPX: Protonix 20mg PO daily


- DVT PPX: SCDs


- PT/OT eval


- Swallow eval 





All medical management per Dr. Parnell. 





Case discussed w/ Dr. Parnell,


Leona Rivera DO, PGY2

## 2018-08-28 NOTE — CP.PCM.CON
History of Present Illness





- History of Present Illness


History of Present Illness: 





The pt is a 58 year old obese man with DM and HTN. Pt has no knwon CAD. Pt had 

a previously normal echo. He had uncontrolled HTN, as well as leg edema. Meds 

were recently changed from ace/arb with norvasc to arb with clorthalidone, to 

help leg edema. pt;s bp at home improved, to the 120 systolic level. But he 

noted slurred speech, dizziness and came to the ER for eval. A ct showed a 

possible dfect, age undetermined, so MRI ordered. Pt feels "99%" better/ 





Echo, I personally reviewed is normal. 





Review of Systems





- Review of Systems


All systems: reviewed and no additional remarkable complaints except (as above)





Past Patient History





- Infectious Disease


Hx of Infectious Diseases: None





- Past Medical History & Family History


Past Medical History?: Yes





- Past Social History


Smoking Status: Never Smoked





- CARDIAC


Hx Hypertension: Yes





- ENDOCRINE/METABOLIC


Hx Diabetes Mellitus Type 2: Yes





- MUSCULOSKELETAL/RHEUMATOLOGICAL


Hx Falls: No





- PSYCHIATRIC


Hx Substance Use: No





- SURGICAL HISTORY


Hx Surgeries: Yes


Other/Comment: Scrotal tumor removal 2013.





- ANESTHESIA


Hx Anesthesia: Yes


Hx Anesthesia Reactions: No


Hx Malignant Hyperthermia: No





Meds


Allergies/Adverse Reactions: 


 Allergies











Allergy/AdvReac Type Severity Reaction Status Date / Time


 


No Known Allergies Allergy   Verified 08/27/18 19:03














- Medications


Medications: 


 Current Medications





Aspirin (Aspirin)  325 mg PO DAILY [facility]


   Last Admin: 08/28/18 10:50 Dose:  325 mg


Carvedilol (Coreg)  12.5 mg PO BID [facility]RAIG


Chlorthalidone (Hygroton)  25 mg PO DAILY [facility]


Dextrose (Glutose 15)  0 gm PO ONCE PRN; Protocol


   PRN Reason: Hypoglycemia Protocol


Dextrose (Dextrose 50% Inj)  0 ml IV STAT PRN; Protocol


   PRN Reason: Hypoglycemia Protocol


Glucagon (Glucagen Diagnostic Kit)  0 mg IM STAT PRN; Protocol


   PRN Reason: Hypoglycemia Protocol


Home Med (Patient's Own Medication)  10 tab PO DAILY [facility]


   Last Admin: 08/28/18 14:08 Dose:  10 tab


Dextrose/Sodium Chloride (Dextrose 5%/0.9% Ns 1000 Ml)  1,000 mls @ 80 mls/hr 

IV .[id number] KRAIG


Dextrose (Dextrose 5% In Water 1000 Ml)  1,000 mls @ 0 mls/hr IV .Q0M PRN; 

Protocol; Per Protocol


   PRN Reason: Hypoglycemia Protocol


Insulin Human Regular (Novolin R)  0 unit SC ACHS [facility]


   PRN Reason: Protocol


Losartan Potassium (Cozaar)  50 mg PO DAILY [facility]


Pantoprazole Sodium (Protonix Ec Tab)  20 mg PO DAILY [facility]


   Last Admin: 08/28/18 10:50 Dose:  20 mg


Pneumococcal Polyvalent Vaccine (Pneumovax 23 Vaccine)  0.5 ml IM .ONCE ONE


   Stop: 08/30/18 10:01


Rosuvastatin Calcium (Crestor)  10 mg PO [facility]











Physical Exam





- Head Exam


Head Exam: ATRAUMATIC





- Neck Exam


Neck exam: Positive for: Normal Inspection





- Respiratory Exam


Respiratory Exam: Clear to Auscultation Bilateral





- Cardiovascular Exam


Cardiovascular Exam: REGULAR RHYTHM





- GI/Abdominal Exam


GI & Abdominal Exam: Normal Bowel Sounds





-  Exam


 Exam: NORMAL INSPECTION





- Back Exam


Back exam: NORMAL INSPECTION





- Neurological Exam


Neurological exam: Alert, CN II-XII Intact, Oriented x3, Reflexes Normal





- Psychiatric Exam


Psychiatric exam: Normal Affect





- Skin


Skin Exam: Normal Color





Results





- Vital Signs


Recent Vital Signs: 


 Last Vital Signs











Temp  98.2 F   08/28/18 07:35


 


Pulse  80   08/28/18 07:35


 


Resp  18   08/28/18 07:35


 


BP  144/83   08/28/18 07:35


 


Pulse Ox  98   08/28/18 07:35














- Labs


Result Diagrams: 


 08/27/18 20:22





 08/27/18 20:22


Labs: 


 Laboratory Results - last 24 hr











  08/27/18 08/27/18 08/27/18





  20:22 20:22 20:22


 


WBC  11.2 H  


 


RBC  5.56  


 


Hgb  13.9  


 


Hct  42.8  


 


MCV  77.1 L  


 


MCH  25.0 L  


 


MCHC  32.4 L  


 


RDW  13.9  


 


Plt Count  347  


 


MPV  8.4  


 


Neut % (Auto)  55.5  


 


Lymph % (Auto)  32.8  


 


Mono % (Auto)  10.6 H  


 


Eos % (Auto)  0.7  


 


Baso % (Auto)  0.4  


 


Neut # (Auto)  6.2  


 


Lymph # (Auto)  3.7  


 


Mono # (Auto)  1.2 H  


 


Eos # (Auto)  0.1  


 


Baso # (Auto)  0.0  


 


PT   12.6 H 


 


INR   1.2 


 


APTT   34 


 


Sodium    143


 


Potassium    3.9


 


Chloride    102


 


Carbon Dioxide    27


 


Anion Gap    19


 


BUN    30 H


 


Creatinine    1.4


 


Est GFR ( Amer)    > 60


 


Est GFR (Non-Af Amer)    52


 


POC Glucose (mg/dL)   


 


Random Glucose    213 H


 


Hemoglobin A1c   


 


Calcium    9.7


 


Total Bilirubin    0.4


 


AST    49


 


ALT    53


 


Alkaline Phosphatase    178 H


 


Troponin I    < 0.0120


 


Total Protein    8.2


 


Albumin    4.9


 


Globulin    3.4


 


Albumin/Globulin Ratio    1.4


 


Triglycerides    164 H D


 


Cholesterol    134


 


LDL Cholesterol Direct    71


 


HDL Cholesterol    24 L


 


Vitamin B12   


 


25-OH Vitamin D Total   


 


Folate   


 


Blood Type   


 


Antibody Screen   














  08/27/18 08/27/18 08/28/18





  20:22 21:01 06:18


 


WBC   


 


RBC   


 


Hgb   


 


Hct   


 


MCV   


 


MCH   


 


MCHC   


 


RDW   


 


Plt Count   


 


MPV   


 


Neut % (Auto)   


 


Lymph % (Auto)   


 


Mono % (Auto)   


 


Eos % (Auto)   


 


Baso % (Auto)   


 


Neut # (Auto)   


 


Lymph # (Auto)   


 


Mono # (Auto)   


 


Eos # (Auto)   


 


Baso # (Auto)   


 


PT   


 


INR   


 


APTT   


 


Sodium   


 


Potassium   


 


Chloride   


 


Carbon Dioxide   


 


Anion Gap   


 


BUN   


 


Creatinine   


 


Est GFR ( Amer)   


 


Est GFR (Non-Af Amer)   


 


POC Glucose (mg/dL)    174 H


 


Random Glucose   


 


Hemoglobin A1c  8.2 H  


 


Calcium   


 


Total Bilirubin   


 


AST   


 


ALT   


 


Alkaline Phosphatase   


 


Troponin I   


 


Total Protein   


 


Albumin   


 


Globulin   


 


Albumin/Globulin Ratio   


 


Triglycerides   


 


Cholesterol   


 


LDL Cholesterol Direct   


 


HDL Cholesterol   


 


Vitamin B12   


 


25-OH Vitamin D Total   


 


Folate   


 


Blood Type   A POSITIVE 


 


Antibody Screen   Negative 














  08/28/18 08/28/18 08/28/18





  11:16 11:16 12:28


 


WBC   


 


RBC   


 


Hgb   


 


Hct   


 


MCV   


 


MCH   


 


MCHC   


 


RDW   


 


Plt Count   


 


MPV   


 


Neut % (Auto)   


 


Lymph % (Auto)   


 


Mono % (Auto)   


 


Eos % (Auto)   


 


Baso % (Auto)   


 


Neut # (Auto)   


 


Lymph # (Auto)   


 


Mono # (Auto)   


 


Eos # (Auto)   


 


Baso # (Auto)   


 


PT   


 


INR   


 


APTT   


 


Sodium   


 


Potassium   


 


Chloride   


 


Carbon Dioxide   


 


Anion Gap   


 


BUN   


 


Creatinine   


 


Est GFR ( Amer)   


 


Est GFR (Non-Af Amer)   


 


POC Glucose (mg/dL)    223 H


 


Random Glucose   


 


Hemoglobin A1c   


 


Calcium   


 


Total Bilirubin   


 


AST   


 


ALT   


 


Alkaline Phosphatase   


 


Troponin I   


 


Total Protein   


 


Albumin   


 


Globulin   


 


Albumin/Globulin Ratio   


 


Triglycerides   


 


Cholesterol   


 


LDL Cholesterol Direct   


 


HDL Cholesterol   


 


Vitamin B12  514  


 


25-OH Vitamin D Total   19.6 L 


 


Folate  16.8  


 


Blood Type   


 


Antibody Screen   














- EKG Data


EKG Interpreted by: Myself


EKG shows normal: Sinus rhythm (NSR, non specific st changes)





Assessment & Plan





- Assessment and Plan (Free Text)


Assessment: 





1. BP on current regimen of coreg and cozaar mildly high. Will increase cozaar. 

Inthe long run, I dont prefer cozaar due to its short half life. 


2. Order carotid doppler


3. Normal Echo


4. No arrhythmias noted.


5. Await mri report.

## 2018-08-28 NOTE — CARD
--------------- APPROVED REPORT --------------





Date of service: 08/28/2018



EXAM: Two-dimensional and M-mode echocardiogram with Doppler and 

color Doppler.



Other Information 

Quality : GoodRhythm : 



INDICATION

CVA/TIA 



2D DIMENSIONS 

IVSd1.2   (0.7-1.1cm)LVDd4.4   (3.9-5.9cm)

PWd1.2   (0.7-1.1cm)LVDs2.6   (2.5-4.0cm)

FS (%) 40.7   %LVEF (%)71.7   (>50%)



M-Mode DIMENSIONS 

Left Atrium (MM)4.42   (2.5-4.0cm)IVSd1.01   (0.7-1.1cm)

Aortic Root3.78   (2.2-3.7cm)LVDd6.43   (4.0-5.6cm)

Aortic Cusp Exc.2.64   (1.5-2.0cm)PWd0.92   (0.7-1.1cm)

FS (%) 46   %LVDs3.50   (2.0-3.8cm)

TAPSE2.4 cmLVEF (%)76   (>50%)



Mitral Valve

MV E Ohyrozfb24.8cm/sMV A Eehrgeca961.5cm/sE/A ratio0.8



TDI

E/Lateral E'0.0E/Medial E'0.0



Tricuspid Valve

TR Peak Pmjqatyh420ih/sTR Peak Gr.60txVxHFHB39gaTc



 LEFT VENTRICLE 

The left ventricle is normal size.

There is normal left ventricular wall thickness.

The left ventricular function is normal.

The left ventricular ejection fraction is within the normal range.

There is normal LV segmental wall motion.

The left ventricular diastolic function is normal for age.

Normal left atrial pressure by Tissue Doppler



 RIGHT VENTRICLE 

The right ventricle is normal size.

There is normal right ventricular wall thickness.

The right ventricular systolic function is normal.



 ATRIA 

The left atrium is borderline dilated.

The right atrium size is normal.



 AORTIC VALVE 

The aortic valve is normal in structure.

No aortic regurgitation is present.

There is no aortic valvular stenosis. 



 MITRAL VALVE 

The mitral valve is normal in structure.

There is no mitral valve regurgitation noted.



 TRICUSPID VALVE 

The tricuspid valve is normal in structure.

There is mild tricuspid regurgitation.

There is no pulmonary hypertension.



 PULMONIC VALVE 

The pulmonary valve is normal in structure.



 GREAT VESSELS 

The aortic root is normal in size.

The IVC is normal in size and collapses >50% with inspiration.



 PERICARDIAL EFFUSION 

There is no pericardial effusion.



<Conclusion>

Normal bi-ventricular function.

The left atrium is borderline dilated.

No significant valvular abnormality.

No pericardial effusion.

## 2018-08-28 NOTE — MRI
Date of service: 



08/28/2018



PROCEDURE:  MRI BRAIN WITHOUT CONTRAST



HISTORY:

RO CVA



COMPARISON:

Noncontrast head CT from 08/27/2018. 



TECHNIQUE:

Multiplanar, multisequence MR images of the brain were obtained 

without intravenous contrast enhancement.



FINDINGS:



HEMORRHAGE:

None



DWI:

There is focal restricted diffusion in the left posterior putamen.



BRAIN PARENCHYMA:

There is T2/FLAIR hyperintense signal corresponding to the area of 

restricted diffusion in the posterior putamen. There is no mass, mass 

effect or abnormal extra-axial fluid collection. The midline sagittal 

structures are normal. 



VENTRICLES:

There is mild age-related global parenchymal volume loss and 

proportionate enlargement of the ventricles and cortical sulci. There 

is a cavum septum pellucidum. 



CRANIUM:

There is normal bone marrow signal pattern.



ORBITS:

Grossly unremarkable.



PARANASAL SINUSES/MASTOIDS:

Predominantly clear.



VASCULAR SYSTEM:

There are normal signal voids in the larger intracranial arteries. 



OTHER FINDINGS:

None. 



IMPRESSION:

1. Late acute/ subacute infarction in the left posterior putamen.



2. Mild age-related global parenchymal volume loss. 



Important findings were discussed with nurse Ame Holder on the 

floor on 08/28/2018 at 3:45 p.m.

## 2018-08-28 NOTE — RAD
Chest x-ray single frontal view 



History: Code stroke. 



Comparison: 06/25/2013 



Findings: 



Biapical pleural thickening. 



No focal infiltrate or effusion. 



Heart size within normal limits. 



Impression: 



No focal infiltrate or effusion.

## 2018-08-29 LAB
ALBUMIN SERPL-MCNC: 4.5 [, G/DL] (ref 3.5–5)
ALBUMIN/GLOB SERPL: 1.3 [,] (ref 1–2.1)
ALT SERPL-CCNC: 56 [, U/L] (ref 21–72)
AST SERPL-CCNC: 50 [, U/L] (ref 17–59)
BASOPHILS # BLD AUTO: 0.1 [, K/UL] (ref 0–0.2)
BASOPHILS NFR BLD: 0.8 [, %] (ref 0–2)
BUN SERPL-MCNC: 17 [, MG/DL] (ref 9–20)
CALCIUM SERPL-MCNC: 9.4 [, MG/DL] (ref 8.6–10.4)
EOSINOPHIL # BLD AUTO: 0.1 [, K/UL] (ref 0–0.7)
EOSINOPHIL NFR BLD: 1.6 [, %] (ref 0–4)
ERYTHROCYTE [DISTWIDTH] IN BLOOD BY AUTOMATED COUNT: 14 [, %] (ref 11.5–14.5)
GFR NON-AFRICAN AMERICAN: > 60 [,]
HGB BLD-MCNC: 14 [, G/DL] (ref 12–18)
LYMPHOCYTES # BLD AUTO: 2.4 [, K/UL] (ref 1–4.3)
LYMPHOCYTES NFR BLD AUTO: 32.9 [, %] (ref 20–40)
MCH RBC QN AUTO: 26 [, PG] (ref 27–31)
MCHC RBC AUTO-ENTMCNC: 33.9 [, G/DL] (ref 33–37)
MCV RBC AUTO: 76.5 [, FL] (ref 80–94)
MONOCYTES # BLD: 0.7 [, K/UL] (ref 0–0.8)
MONOCYTES NFR BLD: 10.2 [, %] (ref 0–10)
NEUTROPHILS # BLD: 4 [, K/UL] (ref 1.8–7)
NEUTROPHILS NFR BLD AUTO: 54.5 [, %] (ref 50–75)
NRBC BLD AUTO-RTO: 0.1 [, %] (ref 0–2)
PLATELET # BLD: 303 [, K/UL] (ref 130–400)
PMV BLD AUTO: 8.5 [, FL] (ref 7.2–11.7)
RBC # BLD AUTO: 5.38 [, MIL/UL] (ref 4.4–5.9)
WBC # BLD AUTO: 7.3 [, K/UL] (ref 4.8–10.8)

## 2018-08-29 RX ADMIN — HUMAN INSULIN SCH UNITS: 100 INJECTION, SOLUTION SUBCUTANEOUS at 11:55

## 2018-08-29 RX ADMIN — PANTOPRAZOLE SODIUM SCH MG: 20 TABLET, DELAYED RELEASE ORAL at 09:27

## 2018-08-29 RX ADMIN — HUMAN INSULIN SCH UNITS: 100 INJECTION, SOLUTION SUBCUTANEOUS at 18:33

## 2018-08-29 RX ADMIN — DEXTROSE AND SODIUM CHLORIDE SCH MLS/HR: 5; 900 INJECTION, SOLUTION INTRAVENOUS at 00:50

## 2018-08-29 RX ADMIN — HUMAN INSULIN SCH: 100 INJECTION, SOLUTION SUBCUTANEOUS at 22:24

## 2018-08-29 RX ADMIN — HUMAN INSULIN SCH UNIT: 100 INJECTION, SOLUTION SUBCUTANEOUS at 08:42

## 2018-08-29 NOTE — CP.PCM.PN
Subjective





- Date & Time of Evaluation


Date of Evaluation: 08/29/18


Time of Evaluation: 16:03





- Subjective


Subjective: 





Pt feels better. BP a little high today. Was better with edarby chlor.





Echo bubble study negative for shunt





Objective





- Vital Signs/Intake and Output


Vital Signs (last 24 hours): 


 











Temp Pulse Resp BP Pulse Ox


 


 98.4 F   62   20   157/80 H  98 


 


 08/29/18 07:00  08/29/18 07:05  08/29/18 07:00  08/29/18 09:27  08/29/18 07:00








Intake and Output: 


 











 08/29/18 08/29/18





 06:59 18:59


 


Intake Total  1600


 


Balance  1600














- Medications


Medications: 


 Current Medications





Aspirin (Aspirin Chewable)  81 mg PO DAILY [facility]


   Last Admin: 08/29/18 09:30 Dose:  81 mg


Carvedilol (Coreg)  12.5 mg PO BID [facility]


   Last Admin: 08/29/18 09:27 Dose:  12.5 mg


Clopidogrel Bisulfate (Plavix)  75 mg PO DAILY [facility]


   Last Admin: 08/29/18 09:29 Dose:  75 mg


Dextrose (Glutose 15)  0 gm PO ONCE PRN; Protocol


   PRN Reason: Hypoglycemia Protocol


Dextrose (Dextrose 50% Inj)  0 ml IV STAT PRN; Protocol


   PRN Reason: Hypoglycemia Protocol


Ergocalciferol (Drisdol 50,000 Intl Units Cap)  1 cap PO QWK [facility]


   Last Admin: 08/28/18 17:06 Dose:  1 cap


Glucagon (Glucagen Diagnostic Kit)  0 mg IM STAT PRN; Protocol


   PRN Reason: Hypoglycemia Protocol


Home Med (Patient's Own Medication)  10 tab PO DAILY [facility]


   Last Admin: 08/29/18 09:29 Dose:  10 tab


Dextrose (Dextrose 5% In Water 1000 Ml)  1,000 mls @ 0 mls/hr IV .Q0M PRN; 

Protocol; Per Protocol


   PRN Reason: Hypoglycemia Protocol


Insulin Human Regular (Novolin R)  0 unit SC ACHS [facility]


   PRN Reason: Protocol


   Last Admin: 08/29/18 11:55 Dose:  3 units


Pantoprazole Sodium (Protonix Ec Tab)  20 mg PO DAILY [facility]


   Last Admin: 08/29/18 09:27 Dose:  20 mg


Pneumococcal Polyvalent Vaccine (Pneumovax 23 Vaccine)  0.5 ml IM .ONCE ONE


   Stop: 08/30/18 10:01


Rosuvastatin Calcium (Crestor)  10 mg PO HS [facility]


   Last Admin: 08/28/18 21:17 Dose:  10 mg











- Labs


Labs: 


 





 08/29/18 08:36 





 08/29/18 08:36 





 











PT  12.6 SECONDS (9.7-12.2)  H  08/27/18  20:22    


 


INR  1.2   08/27/18  20:22    


 


APTT  34 SECONDS (21-34)   08/27/18  20:22    














- Constitutional


Appears: Well





- Head Exam


Head Exam: ATRAUMATIC





- Eye Exam


Eye Exam: EOMI





- ENT Exam


ENT Exam: Mucous Membranes Moist





- Neck Exam


Neck Exam: Full ROM





- Respiratory Exam


Respiratory Exam: Clear to Ausculation Bilateral





- Cardiovascular Exam


Cardiovascular Exam: REGULAR RHYTHM





-  Exam


External exam: NORMAL EXTERNAL EXAM





- Extremities Exam


Extremities Exam: Normal Inspection





- Back Exam


Back Exam: NORMAL INSPECTION





- Psychiatric Exam


Psychiatric exam: Normal Affect





- Skin


Skin Exam: Normal Color





Assessment and Plan





- Assessment and Plan (Free Text)


Assessment: 





1. Pt is stable for d/c. Will check BP in office on previous meds, edarbychlor.


2. Negative bubble study.

## 2018-08-29 NOTE — CARD
--------------- APPROVED REPORT --------------





Date of service: 08/29/2018



EXAM: LIMITED Two-dimensional echocardiogram with bubble



Other Information 

Quality : GoodRhythm : 



INDICATION

CVA/TIA 



RISK FACTORS

Obesity   



Mitral Valve

E/A ratio0.0



TDI

E/Lateral E'0.0E/Medial E'0.0



 LEFT VENTRICLE 

The left ventricle is normal size.

There is normal left ventricular wall thickness.

Left ventricle systolic function is normal.

The Ejection Fraction is 60-65%.

There is normal LV segmental wall motion.

No Doppler



 RIGHT VENTRICLE 

The right ventricle is normal size.

There is normal right ventricular wall thickness.

The right ventricular systolic function is normal.



 ATRIA 

The left atrium size is normal.

The right atrium size is normal.



 AORTIC VALVE 

The aortic valve is not well visualized.



 MITRAL VALVE 

The mitral valve is normal in structure.

There is no evidence of mitral valve prolapse.

No Doppler



 TRICUSPID VALVE 

The tricuspid valve is normal in structure.

No Doppler



 PULMONIC VALVE 

The pulmonic valve is not well visualized.



 GREAT VESSELS 

The aortic root is normal in size.

The IVC was not visualized.



 PERICARDIAL EFFUSION 

There is no pericardial effusion.



<Conclusion>

Left ventricle systolic function is normal.

The Ejection Fraction is 60-65%.

Venous air Buble study revealed no R to L shunt.

## 2018-08-29 NOTE — HP
Copied To:  Hipolito Parnell MD

Attending MD:  Hipolito Parnell MD



HISTORY OF PRESENT ILLNESS:  The patient is a 58-year-old male and has

complaint of slurred speech.  The patient came to the ER, advised

admission.  The patient with hypertension.



PHYSICAL EXAMINATION:

GENERAL:   The patient is awake, alert, oriented.

VITAL SIGNS:  Temperature 98, pulse 90.

HEENT:  Within normal limits.

NECK:  Supple.

CHEST:  Symmetrical.

HEART:  Regular.

ABDOMEN:  Soft.

EXTREMITIES:  No edema.



ASSESSMENT AND PLAN:  The patient suffers from CVA.  The patient to get bed

rest.  Check MRI of the brain.  Neurology consultation.





__________________________________________

Hipolito Parnell MD





DD:  08/28/2018 14:19:53

DT:  08/28/2018 19:07:16

Job # [id number]

## 2018-08-29 NOTE — CP.PCM.PN
Subjective





- Date & Time of Evaluation


Date of Evaluation: 08/29/18


Time of Evaluation: 09:00





- Subjective


Subjective: 





Progress Note for Dr. Parnell's Service





Patient was seen and examined at bedside. Patient reports he feels closely back 

to baseline. Denied any weakness, decrease in strength or sensation in any of 

his 4 extremities. Denied fever, chills, chest pain, SOB, abdominal pain, 

weakness or confusion. 





Objective





- Vital Signs/Intake and Output


Vital Signs (last 24 hours): 


 











Temp Pulse Resp BP Pulse Ox


 


 98.4 F   62   20   157/80 H  98 


 


 08/29/18 07:00  08/29/18 07:05  08/29/18 07:00  08/29/18 09:27  08/29/18 07:00











- Medications


Medications: 


 Current Medications





Aspirin (Aspirin Chewable)  81 mg PO DAILY [facility]


   Last Admin: 08/29/18 09:30 Dose:  81 mg


Carvedilol (Coreg)  12.5 mg PO BID [facility]


   Last Admin: 08/29/18 09:27 Dose:  12.5 mg


Clopidogrel Bisulfate (Plavix)  75 mg PO DAILY [facility]


   Last Admin: 08/29/18 09:29 Dose:  75 mg


Dextrose (Glutose 15)  0 gm PO ONCE PRN; Protocol


   PRN Reason: Hypoglycemia Protocol


Dextrose (Dextrose 50% Inj)  0 ml IV STAT PRN; Protocol


   PRN Reason: Hypoglycemia Protocol


Ergocalciferol (Drisdol 50,000 Intl Units Cap)  1 cap PO QWK [facility]


   Last Admin: 08/28/18 17:06 Dose:  1 cap


Glucagon (Glucagen Diagnostic Kit)  0 mg IM STAT PRN; Protocol


   PRN Reason: Hypoglycemia Protocol


Home Med (Patient's Own Medication)  10 tab PO DAILY [facility]


   Last Admin: 08/29/18 09:29 Dose:  10 tab


Dextrose/Sodium Chloride (Dextrose 5%/0.9% Ns 1000 Ml)  1,000 mls @ 80 mls/hr 

IV .[id number] [facility]


   Last Admin: 08/29/18 00:50 Dose:  80 mls/hr


Dextrose (Dextrose 5% In Water 1000 Ml)  1,000 mls @ 0 mls/hr IV .Q0M PRN; 

Protocol; Per Protocol


   PRN Reason: Hypoglycemia Protocol


Insulin Human Regular (Novolin R)  0 unit SC ACHS [facility]


   PRN Reason: Protocol


   Last Admin: 08/29/18 11:55 Dose:  3 units


Pantoprazole Sodium (Protonix Ec Tab)  20 mg PO DAILY [facility]


   Last Admin: 08/29/18 09:27 Dose:  20 mg


Pneumococcal Polyvalent Vaccine (Pneumovax 23 Vaccine)  0.5 ml IM .ONCE ONE


   Stop: 08/30/18 10:01


Rosuvastatin Calcium (Crestor)  10 mg PO HS [facility]


   Last Admin: 08/28/18 21:17 Dose:  10 mg











- Labs


Labs: 


 





 08/29/18 08:36 





 08/29/18 08:36 





 











PT  12.6 SECONDS (9.7-12.2)  H  08/27/18  20:22    


 


INR  1.2   08/27/18  20:22    


 


APTT  34 SECONDS (21-34)   08/27/18  20:22    














- Additional Findings


Additional findings: 





- Constitutional


Appears: No Acute Distress





- Head Exam


Head Exam: NORMAL INSPECTION, NORMOCEPHALIC





- Eye Exam


Eye Exam: EOMI, Normal appearance, PERRL


Pupil Exam: NORMAL ACCOMODATION





- ENT Exam


ENT Exam: Mucous Membranes Moist, Normal Exam





- Neck Exam


Neck Exam: Normal Inspection





- Respiratory Exam


Respiratory Exam: Clear to Ausculation Bilateral, NORMAL BREATHING PATTERN.  

absent: Decreased Breath Sounds





- Cardiovascular Exam


Cardiovascular Exam: REGULAR RHYTHM





- GI/Abdominal Exam


GI & Abdominal Exam: Soft, Normal Bowel Sounds.  absent: Distended, Tenderness





- Rectal Exam


Rectal Exam: Deferred





- Extremities Exam


Extremities Exam: Normal Inspection.  absent: Pedal Edema, Tenderness





- Neurological Exam


Neurological Exam: Alert, Awake, CN II-XII Intact, Normal Gait, Oriented x3, 

Reflexes Normal


Neuro motor strength exam: Left Upper Extremity: 5, Right Upper Extremity: 5, 

Left Lower Extremity: 5, Right Lower Extremity: 5





- Psychiatric Exam


Psychiatric exam: Normal Affect, Normal Mood





- Skin


Skin Exam: Dry, Intact, Normal Color, Warm





Assessment and Plan





- Assessment and Plan (Free Text)


Plan: 





Subacute Infarct 


Neurology consulted - Dr. Rudolph 





Imaging:





- Head CT (8/27): 1.1 centimeter focal area of hypoattenuation seen in the left 

external capsule/ basal ganglia region suggestive for age-indeterminate 

ischemic change. Clinical correlation. Correlation with MRI may be helpful for 

further evaluation if clinically indicated to better determine chronicity of 

possible ischemic change. 


- Brain MRI (8/28): 1. Late acute/ subacute infarction in the left posterior 

putamen. 2. Mild age-related global parenchymal volume loss. 


- ECHO: pending official read; read by Dr. Thomason as normal. 


- Head / Neck CTA: Trace bilateral carotid bulbar atherosclerotic plaque. No 

occlusion or significant stenosis seen in CT angiography of the head or neck. 

No arteriovascular malformation or aneurysm identified.





- Reccs for outpatient holter monitor to access any paroxysmal atrial 

fibrillation 


- ECHO with bubble study rule out shunt/ PFO: PENDING OFFICIAL READ 


- Patient will follow up with neurology outpatient for follow up and platelet 

function testing





Meds:


- Aspirin daily and Plavix daily - x 3 weeks, and Crestor daily 





Hypertension


HLD


Cardiology consulted: Dr. Thomason





- Coreg 12.5mg PO BID 


- Will hold off on other blood pressure medications, allowing for permissive 

hypertension (SBPs 140s) 





Type 2 Diabetes Mellitus


- Accuchecks 


- Hemoglobin A1C 8.2 


- ISS - low 


- Home medications to be resumed on discharge: Metformin, Glipizide 





Vitamin D Deficiency


- Vitamin D 19.6


- Ergocalciferol 50,000 K 1 tablet weekly for 6 months  





Prophylactic Measures 


- GI PPX: Protonix 20mg PO daily


- DVT PPX: SCDs


- PT/OT eval


- Swallow eval 





Disposition: Patient is pending ECHO bubble study official read for discharge. 





Patient will continue with Aspirin and Plavix for 3 weeks total and follow up 

with Dr. Rudolph (neurologist) within 2-3 weeks. Please continue with the 

Edarbyclor, Lipitor, Coreg, Metformin, Glucotrol, and Omeprazole. Your vitamin 

D level is low you will be taking Ergocalciferol 1 tablet once a week for 6 

months. 





Please follow up with your primary care doctor within 1-2 weeks (if no primary 

care please follow up with Dr. Parnell). Follow up with Dr. Rudolph (neurologist) 

in 2-3 weeks. 





Please continue to diet, exercise, and lose weight. Please take care and be 

well. 





All medical management per Dr. Parnell. 





Case discussed w/ Dr. Parnell,


Leona Rivera DO, PGY2

## 2018-08-29 NOTE — CT
Date of service: 



08/28/2018



PROCEDURE:  CT Angiography of the Brain and Neck.



HISTORY:

subacute infarct



COMPARISON:

None available.



TECHNIQUE:

CT angiography of the intracranial and neck arteries was performed. 

Coronal and sagittal maximum intensity projection reformatted images 

were generated.



Contrast Dose: Visipaque 320, 100 cc



Radiation dose:Total exam DLP = 770.86 mGy-cm.



This CT exam was performed using one or more of the following dose 

reduction techniques: Automated exposure control, adjustment of the 

mA and/or kV according to patient size, and/or use of iterative 

reconstruction technique.



FINDINGS:



INTERNAL CEREBRAL ARTERIES:

Unremarkable. The skull base, petrous, cavernous and supraclinoid 

segments are bilaterally widely patent. 



ANTERIOR CEREBRAL ARTERIES:

Unremarkable. A1 and A2 segments are widely patent. Smaller distal 

branches unremarkable, as visualized.



MIDDLE CEREBRAL ARTERIES:

Unremarkable. M1 and M2 segments are widely patent. Perisylvian 

branches grossly symmetric.



POSTERIOR CIRCULATION:

Basilar Artery: Unremarkable.



Distal Vertebral Arteries: Unremarkable.



Posterior Cerebral Arteries: Unremarkable.



Posterior Inferior Cerebellar Arteries: Unremarkable.



NECK CTA:



Common Carotid arteries: The bilateral common carotid appear widely 

patent from their origins to their bifurcations with no significant 

stenosis appreciated. No evidence to suggest common carotid artery 

dissection. Limited bilateral carotid bulbar atherosclerotic plaque 

is identified. 



Internal Carotid arteries: No significant stenosis is appreciated 

throughout the cervical internal carotid artery segments bilaterally 

and there is no evidence of dissection either.



External Carotid arteries: Appear unremarkable bilaterally. 



Vertebral arteries: The bilateral vertebral arteries appear normal in 

caliber from their origins to their junction with the basilar artery. 

No significant stenosis or definite pattern of dissection.  



ANEURYSM/ VASCULAR MALFORMATIONS:

None.



OTHER FINDINGS:

None. 



IMPRESSION:

Trace bilateral carotid bulbar atherosclerotic plaque. No occlusion 

or significant stenosis seen in CT angiography of the head or neck. 

No arteriovascular malformation or aneurysm identified.

## 2018-08-29 NOTE — CARD
--------------- APPROVED REPORT --------------





Date of service: 08/27/2018



EKG Measurement

Heart [id number]

ID [id number]

[id number]

[id number]

[id number]



<Conclusion>

Normal sinus rhythm

Possible Left atrial enlargement

Nonspecific T wave abnormality

Abnormal ECG

## 2018-08-30 VITALS — TEMPERATURE: 98.3 F | RESPIRATION RATE: 18 BRPM | HEART RATE: 70 BPM | OXYGEN SATURATION: 97 %

## 2018-08-30 VITALS — DIASTOLIC BLOOD PRESSURE: 80 MMHG | SYSTOLIC BLOOD PRESSURE: 138 MMHG

## 2018-08-30 LAB
ALBUMIN SERPL-MCNC: 4.1 [, G/DL] (ref 3.5–5)
ALBUMIN/GLOB SERPL: 1.3 [,] (ref 1–2.1)
ALT SERPL-CCNC: 47 [, U/L] (ref 21–72)
AST SERPL-CCNC: 31 [, U/L] (ref 17–59)
BASOPHILS # BLD AUTO: 0 [, K/UL] (ref 0–0.2)
BASOPHILS NFR BLD: 0.5 [, %] (ref 0–2)
BUN SERPL-MCNC: 18 [, MG/DL] (ref 9–20)
CALCIUM SERPL-MCNC: 9 [, MG/DL] (ref 8.6–10.4)
EOSINOPHIL # BLD AUTO: 0.1 [, K/UL] (ref 0–0.7)
EOSINOPHIL NFR BLD: 1.5 [, %] (ref 0–4)
ERYTHROCYTE [DISTWIDTH] IN BLOOD BY AUTOMATED COUNT: 14 [, %] (ref 11.5–14.5)
GFR NON-AFRICAN AMERICAN: > 60 [,]
HGB BLD-MCNC: 13.2 [, G/DL] (ref 12–18)
LYMPHOCYTES # BLD AUTO: 2.9 [, K/UL] (ref 1–4.3)
LYMPHOCYTES NFR BLD AUTO: 39.8 [, %] (ref 20–40)
MCH RBC QN AUTO: 25.9 [, PG] (ref 27–31)
MCHC RBC AUTO-ENTMCNC: 33.8 [, G/DL] (ref 33–37)
MCV RBC AUTO: 76.8 [, FL] (ref 80–94)
MONOCYTES # BLD: 0.9 [, K/UL] (ref 0–0.8)
MONOCYTES NFR BLD: 12.1 [, %] (ref 0–10)
NEUTROPHILS # BLD: 3.3 [, K/UL] (ref 1.8–7)
NEUTROPHILS NFR BLD AUTO: 46.1 [, %] (ref 50–75)
NRBC BLD AUTO-RTO: 0.1 [, %] (ref 0–2)
PLATELET # BLD: 281 [, K/UL] (ref 130–400)
PMV BLD AUTO: 8.4 [, FL] (ref 7.2–11.7)
RBC # BLD AUTO: 5.1 [, MIL/UL] (ref 4.4–5.9)
WBC # BLD AUTO: 7.3 [, K/UL] (ref 4.8–10.8)

## 2018-08-30 RX ADMIN — PANTOPRAZOLE SODIUM SCH MG: 20 TABLET, DELAYED RELEASE ORAL at 09:13

## 2018-08-30 RX ADMIN — HUMAN INSULIN SCH UNITS: 100 INJECTION, SOLUTION SUBCUTANEOUS at 08:20

## 2018-08-30 NOTE — CP.PCM.PN
Subjective





- Date & Time of Evaluation


Date of Evaluation: 08/30/18


Time of Evaluation: 09:00





- Subjective


Subjective: 





Progress Note for Dr. Parnell's Service





Patient was seen and examined at bedside. Patient reports he feels well and is 

ready to go home. No acute complaints. Denied any weakness, decrease in 

strength or sensation in any of his 4 extremities. Denied fever, chills, chest 

pain, SOB, abdominal pain, weakness or confusion. 





Objective





- Vital Signs/Intake and Output


Vital Signs (last 24 hours): 


 











Temp Pulse Resp BP Pulse Ox


 


 98.3 F   70   18   138/80   97 


 


 08/30/18 07:30  08/30/18 07:30  08/30/18 07:30  08/30/18 09:14  08/30/18 07:30











- Medications


Medications: 


 Current Medications





Aspirin (Aspirin Chewable)  81 mg PO DAILY [facility]


   Last Admin: 08/30/18 09:13 Dose:  81 mg


Carvedilol (Coreg)  12.5 mg PO BID [facility]


   Last Admin: 08/30/18 09:14 Dose:  12.5 mg


Clopidogrel Bisulfate (Plavix)  75 mg PO DAILY [facility]


   Last Admin: 08/30/18 09:13 Dose:  75 mg


Dextrose (Glutose 15)  0 gm PO ONCE PRN; Protocol


   PRN Reason: Hypoglycemia Protocol


Dextrose (Dextrose 50% Inj)  0 ml IV STAT PRN; Protocol


   PRN Reason: Hypoglycemia Protocol


Ergocalciferol (Drisdol 50,000 Intl Units Cap)  1 cap PO QWK [facility]


   Last Admin: 08/28/18 17:06 Dose:  1 cap


Glucagon (Glucagen Diagnostic Kit)  0 mg IM STAT PRN; Protocol


   PRN Reason: Hypoglycemia Protocol


Home Med (Patient's Own Medication)  10 tab PO DAILY [facility]


   Last Admin: 08/30/18 09:18 Dose:  10 tab


Dextrose (Dextrose 5% In Water 1000 Ml)  1,000 mls @ 0 mls/hr IV .Q0M PRN; 

Protocol; Per Protocol


   PRN Reason: Hypoglycemia Protocol


Insulin Human Regular (Novolin R)  0 unit SC ACHS [facility]


   PRN Reason: Protocol


   Last Admin: 08/30/18 08:20 Dose:  2 units


Pantoprazole Sodium (Protonix Ec Tab)  20 mg PO DAILY [facility]


   Last Admin: 08/30/18 09:13 Dose:  20 mg


Rosuvastatin Calcium (Crestor)  10 mg PO HS [facility]


   Last Admin: 08/29/18 22:37 Dose:  10 mg











- Labs


Labs: 


 





 08/30/18 06:48 





 08/30/18 06:48 





 











PT  12.6 SECONDS (9.7-12.2)  H  08/27/18  20:22    


 


INR  1.2   08/27/18  20:22    


 


APTT  34 SECONDS (21-34)   08/27/18  20:22    














- Additional Findings


Additional findings: 





- Constitutional


Appears: No Acute Distress





- Head Exam


Head Exam: NORMAL INSPECTION, NORMOCEPHALIC





- Eye Exam


Eye Exam: EOMI, Normal appearance, PERRL


Pupil Exam: NORMAL ACCOMODATION





- ENT Exam


ENT Exam: Mucous Membranes Moist, Normal Exam





- Neck Exam


Neck Exam: Normal Inspection





- Respiratory Exam


Respiratory Exam: Clear to Ausculation Bilateral, NORMAL BREATHING PATTERN.  

absent: Decreased Breath Sounds





- Cardiovascular Exam


Cardiovascular Exam: REGULAR RHYTHM





- GI/Abdominal Exam


GI & Abdominal Exam: Soft, Normal Bowel Sounds.  absent: Distended, Tenderness





- Rectal Exam


Rectal Exam: Deferred





- Extremities Exam


Extremities Exam: Normal Inspection.  absent: Pedal Edema, Tenderness





- Neurological Exam


Neurological Exam: Alert, Awake, CN II-XII Intact, Normal Gait, Oriented x3, 

Reflexes Normal


Neuro motor strength exam: Left Upper Extremity: 5, Right Upper Extremity: 5, 

Left Lower Extremity: 5, Right Lower Extremity: 5





- Psychiatric Exam


Psychiatric exam: Normal Affect, Normal Mood





- Skin


Skin Exam: Dry, Intact, Normal Color, Warm





Assessment and Plan





- Assessment and Plan (Free Text)


Plan: 





Subacute Infarct 


Neurology consulted - Dr. Rudolph 





Imaging:





- Head CT (8/27): 1.1 centimeter focal area of hypoattenuation seen in the left 

external capsule/ basal ganglia region suggestive for age-indeterminate 

ischemic change. Clinical correlation. Correlation with MRI may be helpful for 

further evaluation if clinically indicated to better determine chronicity of 

possible ischemic change. 


- Brain MRI (8/28): 1. Late acute/ subacute infarction in the left posterior 

putamen. 2. Mild age-related global parenchymal volume loss. 


- ECHO: pending official read; read by Dr. Thomason as normal. 


- Head / Neck CTA: Trace bilateral carotid bulbar atherosclerotic plaque. No 

occlusion or significant stenosis seen in CT angiography of the head or neck. 

No arteriovascular malformation or aneurysm identified.





- Reccs for outpatient holter monitor to access any paroxysmal atrial 

fibrillation 


- ECHO with bubble study rule out shunt/ PFO: normal no Shunt noted 


- Patient will follow up with neurology outpatient for follow up and platelet 

function testing





Meds:


- Aspirin daily and Plavix daily - x 3 weeks, and Crestor daily 





Hypertension


HLD


Cardiology consulted: Dr. Thomason





- Coreg 12.5mg PO BID 


- Will hold off on other blood pressure medications, allowing for permissive 

hypertension (SBPs 140s) 





Type 2 Diabetes Mellitus


- Accuchecks 


- Hemoglobin A1C 8.2 


- ISS - low 


- Home medications to be resumed on discharge: Metformin, Glipizide 





Vitamin D Deficiency


- Vitamin D 19.6


- Ergocalciferol 50,000 K 1 tablet weekly for 6 months  





Prophylactic Measures 


- GI PPX: Protonix 20mg PO daily


- DVT PPX: SCDs


- PT/OT eval


- Swallow eval 





Disposition: Patient is stable for discharge. 





*********


Patient will continue with Aspirin and Plavix for 3 weeks total and follow up 

with Dr. Rudolph (neurologist) within 2-3 weeks. Please continue with the 

Edarbyclor, Lipitor, Coreg, Metformin, Glucotrol, and Omeprazole. Your vitamin 

D level is low you will be taking Ergocalciferol 1 tablet once a week for 6 

months. 





Please follow up with your primary care doctor within 1-2 weeks (if no primary 

care please follow up with Dr. Parnell). Follow up with Dr. Rudolph (neurologist) 

in 2-3 weeks. 





Dr. Rudolph


[address] 


[facility] [address] 


tel 





Please continue to diet, exercise, and lose weight. Please take care and be 

well. 





********





All medical management per Dr. Parnell. 





Case discussed w/ Dr. Parnell,


Leona Rivera DO, PGY2

## 2018-08-30 NOTE — CP.PCM.PN
Subjective





- Date & Time of Evaluation


Date of Evaluation: 08/30/18


Time of Evaluation: 09:19





- Subjective


Subjective: 





Mr. Rutherford was seen and examined at the bedside. He remains alert, oriented in 

all spheres. He denies any headache, dizziness, lightheadedness and claims of 

his speech almost back to his baseline. He is able to follow simple commands 

with mild weakness noted in his left side. CTA of the head and neck showed  

Trace bilateral carotid bulbar atherosclerotic plaque. No occlusion or 

significant stenosis seen in CT angiography of the head or neck. No 

arteriovascular malformation or aneurysm identified. Echocardiogram showed 

normal EF and LV function, no patent shunt.There was no untoward events 

overnight.





Objective





- Vital Signs/Intake and Output


Vital Signs (last 24 hours): 


 











Temp Pulse Resp BP Pulse Ox


 


 98.3 F   70   18   138/80   97 


 


 08/30/18 07:30  08/30/18 07:30  08/30/18 07:30  08/30/18 09:14  08/30/18 07:30











- Medications


Medications: 


 Current Medications





Aspirin (Aspirin Chewable)  81 mg PO DAILY [facility]


   Last Admin: 08/30/18 09:13 Dose:  81 mg


Carvedilol (Coreg)  12.5 mg PO BID [facility]


   Last Admin: 08/30/18 09:14 Dose:  12.5 mg


Clopidogrel Bisulfate (Plavix)  75 mg PO DAILY [facility]


   Last Admin: 08/30/18 09:13 Dose:  75 mg


Dextrose (Glutose 15)  0 gm PO ONCE PRN; Protocol


   PRN Reason: Hypoglycemia Protocol


Dextrose (Dextrose 50% Inj)  0 ml IV STAT PRN; Protocol


   PRN Reason: Hypoglycemia Protocol


Ergocalciferol (Drisdol 50,000 Intl Units Cap)  1 cap PO QWK [facility]


   Last Admin: 08/28/18 17:06 Dose:  1 cap


Glucagon (Glucagen Diagnostic Kit)  0 mg IM STAT PRN; Protocol


   PRN Reason: Hypoglycemia Protocol


Home Med (Patient's Own Medication)  10 tab PO DAILY [facility]


   Last Admin: 08/29/18 09:29 Dose:  10 tab


Dextrose (Dextrose 5% In Water 1000 Ml)  1,000 mls @ 0 mls/hr IV .Q0M PRN; 

Protocol; Per Protocol


   PRN Reason: Hypoglycemia Protocol


Insulin Human Regular (Novolin R)  0 unit SC ACHS [facility]


   PRN Reason: Protocol


   Last Admin: 08/30/18 08:20 Dose:  2 units


Pantoprazole Sodium (Protonix Ec Tab)  20 mg PO DAILY [facility]


   Last Admin: 08/30/18 09:13 Dose:  20 mg


Pneumococcal Polyvalent Vaccine (Pneumovax 23 Vaccine)  0.5 ml IM .ONCE ONE


   Stop: 08/30/18 10:01


Rosuvastatin Calcium (Crestor)  10 mg PO HS [facility]


   Last Admin: 08/29/18 22:37 Dose:  10 mg











- Labs


Labs: 


 





 08/30/18 06:48 





 08/30/18 06:48 





 











PT  12.6 SECONDS (9.7-12.2)  H  08/27/18  20:22    


 


INR  1.2   08/27/18  20:22    


 


APTT  34 SECONDS (21-34)   08/27/18  20:22    














- Constitutional


Appears: No Acute Distress





- Head Exam


Head Exam: NORMAL INSPECTION





- Eye Exam


Pupil Exam: PERRL





- Neurological Exam


Neurological Exam: Alert, Awake, Oriented x3


Neuro motor strength exam: Left Upper Extremity: 4 (mild), Right Upper Extremity

: 5, Left Lower Extremity: 4 (mild), Right Lower Extremity: 5


Additional comments: 





alert, oriented, follows commands, sensation is intact.





Assessment and Plan


(1) CVA (cerebral vascular accident)


Assessment & Plan: 


Case discussed with Dr. Rudolph, continue all current medical, physical, 

occupational, and speech therapies. Recommend to follow up with a neurologist ( 

Dr. Rudolph) at [address] [place] [address]. [address] [facility] [address] tel 

, to determine when to stop one of the anti-platelet, blood pressure and 

glycemic control. hydration.


Status: Acute

## 2018-09-01 NOTE — DS
Copied To:  Hipolito Parnell MD

Attending MD:  Hipolito Parnell MD



A 58-year-old male with slurred speech.  The patient came to the hospital

____ stroke.  The patient also has hypertension, not compliant.  The

patient continue to take aspirin.  Neurology, Cardiology, Doppler study

done, negative.  The patient is started on aspirin and Plavix.  Follow with

Neurology in 2 weeks.





__________________________________________

Hipolito Parnell MD





DD:  08/30/2018 10:48:01

DT:  08/30/2018 17:44:05

Job # [id number]

## 2019-02-11 ENCOUNTER — HOSPITAL ENCOUNTER (EMERGENCY)
Dept: HOSPITAL 31 - C.ER | Age: 59
Discharge: HOME | End: 2019-02-11
Payer: COMMERCIAL

## 2019-02-11 VITALS — SYSTOLIC BLOOD PRESSURE: 143 MMHG | RESPIRATION RATE: 20 BRPM | DIASTOLIC BLOOD PRESSURE: 72 MMHG | HEART RATE: 109 BPM

## 2019-02-11 VITALS — BODY MASS INDEX: 37.5 KG/M2

## 2019-02-11 VITALS — TEMPERATURE: 99.3 F

## 2019-02-11 VITALS — OXYGEN SATURATION: 97 %

## 2019-02-11 DIAGNOSIS — E11.65: Primary | ICD-10-CM

## 2019-02-11 DIAGNOSIS — R11.10: ICD-10-CM

## 2019-02-11 DIAGNOSIS — R19.7: ICD-10-CM

## 2019-02-11 LAB
ALBUMIN SERPL-MCNC: 4.7 G/DL (ref 3.5–5)
ALBUMIN/GLOB SERPL: 1.4 {RATIO} (ref 1–2.1)
ALT SERPL-CCNC: 41 U/L (ref 21–72)
AST SERPL-CCNC: 38 U/L (ref 17–59)
BASOPHILS # BLD AUTO: 0.1 K/UL (ref 0–0.2)
BASOPHILS NFR BLD: 0.6 % (ref 0–2)
BILIRUB UR-MCNC: NEGATIVE MG/DL
BUN SERPL-MCNC: 24 MG/DL (ref 9–20)
CALCIUM SERPL-MCNC: 8.8 MG/DL (ref 8.6–10.4)
EOSINOPHIL # BLD AUTO: 0 K/UL (ref 0–0.7)
EOSINOPHIL NFR BLD: 0.2 % (ref 0–4)
ERYTHROCYTE [DISTWIDTH] IN BLOOD BY AUTOMATED COUNT: 13.7 % (ref 11.5–14.5)
GFR NON-AFRICAN AMERICAN: > 60
GLUCOSE UR STRIP-MCNC: (no result) MG/DL
HGB BLD-MCNC: 13.4 G/DL (ref 12–18)
HYPOCHROMIC: SLIGHT
LEUKOCYTE ESTERASE UR-ACNC: (no result) LEU/UL
LIPASE: 141 U/L (ref 23–300)
LYMPHOCYTE: 6 % (ref 20–40)
LYMPHOCYTES # BLD AUTO: 0.5 K/UL (ref 1–4.3)
LYMPHOCYTES NFR BLD AUTO: 4.8 % (ref 20–40)
MCH RBC QN AUTO: 26.7 PG (ref 27–31)
MCHC RBC AUTO-ENTMCNC: 33.4 G/DL (ref 33–37)
MCV RBC AUTO: 80.1 FL (ref 80–94)
MONOCYTE: 8 % (ref 0–10)
MONOCYTES # BLD: 0.9 K/UL (ref 0–0.8)
MONOCYTES NFR BLD: 9.2 % (ref 0–10)
NEUTROPHILS # BLD: 8.6 K/UL (ref 1.8–7)
NEUTROPHILS NFR BLD AUTO: 83 % (ref 50–75)
NEUTROPHILS NFR BLD AUTO: 85.2 % (ref 50–75)
NEUTS BAND NFR BLD: 2 % (ref 0–2)
NRBC BLD AUTO-RTO: 0 % (ref 0–2)
PH UR STRIP: 5 [PH] (ref 5–8)
PLATELET # BLD EST: NORMAL 10*3/UL
PLATELET # BLD: 284 K/UL (ref 130–400)
PMV BLD AUTO: 8.7 FL (ref 7.2–11.7)
PROT UR STRIP-MCNC: NEGATIVE MG/DL
RBC # BLD AUTO: 5.03 MIL/UL (ref 4.4–5.9)
RBC # UR STRIP: NEGATIVE /UL
RBC MORPH BLD: NORMAL
SP GR UR STRIP: 1.02 (ref 1–1.03)
SQUAMOUS EPITHIAL: 1 /HPF (ref 0–5)
TOTAL CELLS COUNTED BLD: 100
UROBILINOGEN UR-MCNC: NORMAL MG/DL (ref 0.2–1)
VARIANT LYMPHS NFR BLD MANUAL: 1 % (ref 0–0)
WBC # BLD AUTO: 10 K/UL (ref 4.8–10.8)

## 2019-02-11 PROCEDURE — 81001 URINALYSIS AUTO W/SCOPE: CPT

## 2019-02-11 PROCEDURE — 85025 COMPLETE CBC W/AUTO DIFF WBC: CPT

## 2019-02-11 PROCEDURE — 96374 THER/PROPH/DIAG INJ IV PUSH: CPT

## 2019-02-11 PROCEDURE — 96361 HYDRATE IV INFUSION ADD-ON: CPT

## 2019-02-11 PROCEDURE — 83690 ASSAY OF LIPASE: CPT

## 2019-02-11 PROCEDURE — 80053 COMPREHEN METABOLIC PANEL: CPT

## 2019-02-11 PROCEDURE — 99285 EMERGENCY DEPT VISIT HI MDM: CPT

## 2019-02-11 PROCEDURE — 96375 TX/PRO/DX INJ NEW DRUG ADDON: CPT

## 2019-02-11 PROCEDURE — 82948 REAGENT STRIP/BLOOD GLUCOSE: CPT
